# Patient Record
Sex: FEMALE | Race: WHITE
[De-identification: names, ages, dates, MRNs, and addresses within clinical notes are randomized per-mention and may not be internally consistent; named-entity substitution may affect disease eponyms.]

---

## 2017-01-17 ENCOUNTER — HOSPITAL ENCOUNTER (EMERGENCY)
Dept: HOSPITAL 62 - ER | Age: 27
Discharge: HOME | End: 2017-01-17
Payer: SELF-PAY

## 2017-01-17 VITALS — SYSTOLIC BLOOD PRESSURE: 120 MMHG | DIASTOLIC BLOOD PRESSURE: 69 MMHG

## 2017-01-17 DIAGNOSIS — S10.95XA: Primary | ICD-10-CM

## 2017-01-17 DIAGNOSIS — F17.200: ICD-10-CM
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
## 2017-01-17 DIAGNOSIS — Z88.2: ICD-10-CM

## 2017-01-17 DIAGNOSIS — Z88.0: ICD-10-CM

## 2017-01-17 PROCEDURE — 70360 X-RAY EXAM OF NECK: CPT

## 2017-01-17 PROCEDURE — 99283 EMERGENCY DEPT VISIT LOW MDM: CPT

## 2017-01-17 NOTE — ER DOCUMENT REPORT
ED Neck/Back Problem





- General


Chief Complaint: Neck Problem


Stated Complaint: POSSIBLE FOREIGN OBJECT IN NECK


Mode of Arrival: Ambulatory


Information source: Patient


Notes: 


25 y/o F presents to ED  requesting Xray of neck. Pt reports hx of IV drug use 

and states thinks has broken needle in left side of neck. States incident 

occurred approximately 6 months ago and has been encarcerated for the last 6 

months and was recently released and has not been evaluated for this before.  

Reports was evaluated by primary care provider earlier this week and was 

referred to ED for an x-ray.  Denies fever, pain, swelling, redness of breath, 

difficuly breathing or swallowing. 





TRAVEL OUTSIDE OF THE [place] IN LAST 30 DAYS: No





- HPI


Onset: Chronic


Pain Level: Denies


Recent injury: No


Recently seen / treated by doctor: Yes





- Related Data


Allergies/Adverse Reactions: 


 





Penicillins Allergy (Severe, Verified 12/01/15 10:58)


 Hives,anaphylaxis


Sulfa (Sulfonamide Antibiotics) Allergy (Severe, Verified 12/01/15 10:58)


 Hives,anaphylaxis











Past Medical History





- General


Information source: Patient





- Social History


Smoking Status: Current Every Day Smoker


Frequency of alcohol use: None


Drug Abuse: None


Lives with: Family


Family History: Malignancy


Patient has suicidal ideation: No


Patient has homicidal ideation: No


Pulmonary Medical History: Reports: Hx Bronchitis


Renal/ Medical History: Denies: Hx Peritoneal Dialysis


Musculoskeltal Medical History: Denies Hx Fibromyalgia, Reports Hx 

Musculoskeletal Trauma


Psychiatric Medical History: 


   Denies: Hx Bipolar Disorder, Hx Depression, Hx Post Traumatic Stress Disorder

, Hx Schizophrenia


Traumatic Medical History: Reports: Hx Fractures - lt foot   sx 2009


Infectious Medical History: Denies: Hx HIV


Past Surgical History: Reports: Hx  Section, Hx Oral Surgery, Hx 

Orthopedic Surgery - Left foot surgery





- Immunizations


Immunizations up to date: Yes


Hx Diphtheria, Pertussis, Tetanus Vaccination: Yes





Review of Systems





- Review of Systems


Constitutional: No symptoms reported


EENT: See HPI


Cardiovascular: No symptoms reported


Respiratory: No symptoms reported


Gastrointestinal: No symptoms reported


Genitourinary: No symptoms reported


Female Genitourinary: No symptoms reported


Musculoskeletal: No symptoms reported


Skin: No symptoms reported


Hematologic/Lymphatic: No symptoms reported


Neurological/Psychological: No symptoms reported


-: Yes All other systems reviewed and negative





Physical Exam





- Vital signs


Vitals: 


 











Temp Pulse Resp BP Pulse Ox


 


 98.1 F   75   16   122/63   96 


 


 17 15:37  17 15:37  17 15:37  17 15:37  17 15:37











Interpretation: Normal





- General


General appearance: Appears well, Alert


In distress: None





- HEENT


Head: Normocephalic, Atraumatic


Eyes: Normal


Conjunctiva: Normal


Extraocular movements intact: Yes


Eyelashes: Normal


Pupils: PERRL


Ears: Normal


External canal: Normal


Tympanic membrane: Normal


Nasal: Normal


Mouth/Lips: Normal


Mucous membranes: Normal, Moist


Pharynx: Normal.  No: Blood in hypopharynx, Erythema, Exudate, Peritonsillar 

abscess, Post nasal drainage, Retropharyngeal abscess, Tonsillar hypertrophy, 

Uvular edema, Potential airway comprom., Other


Neck: Normal, Other - Superficial old healed skin scar/sherron marks to 

bilateral lower neck likely from reported iv drug use.  No swelling, crepitus, 

erythema, warmth, or palpable foreign body..  No: Anterior cervical chain, 

Posterior cervical chain, Lymphadenopathy, Meningismus, Neck mass, Subcutaneous 

emphysema





- Respiratory


Respiratory status: No respiratory distress


Chest status: Nontender


Breath sounds: Normal - CTAB


Chest palpation: Normal





- Cardiovascular


Rhythm: Regular


Heart sounds: Normal auscultation


Murmur: No


Pulses: Normal: Radial, Posterior tibial, Dorsalis pedis


Normal capillary refill: Yes





- Abdominal


Inspection: Normal


Distension: No distension


Bowel sounds: Normal


Tenderness: Nontender


Organomegaly: No organomegaly





- Back


Back: Normal, Nontender





- Extremities


General upper extremity: Normal inspection, Nontender, Normal color, Normal ROM

, Normal strength, Normal temperature


General lower extremity: Normal inspection, Nontender, Normal color, Normal ROM

, Normal strength, Normal temperature, Normal weight bearing





- Neurological


Neuro grossly intact: Yes


Cognition: Normal


Orientation: AAOx4


Smith Coma Scale Eye Opening: Spontaneous


Smith Coma Scale Verbal: Oriented


Smith Coma Scale Motor: Obeys Commands


Smith Coma Scale Total: 15


Speech: Normal


Motor strength normal: LUE, RUE, LLE, RLE


Sensory: Normal





- Psychological


Associated symptoms: Normal affect, Normal mood





- Skin


Skin Temperature: Warm


Skin Moisture: Dry


Skin Color: Normal





Course





- Re-evaluation


Re-evalutation: 


17 17:43


She hemodynamically stable, in no distress.  X-rays show bilateral lower neck/

supraclavicular region metallic foreign bodies in the resemblance of small 

gauge needles without any other abnormalities.  Patient denies any symptoms or 

complaints, has no suggestion of acute complication from foreign bodies and 

appears stable for discharge at this time.  Patient states has appointment with 

primary care provider for follow-up of x-rays in 2 days which she agrees to 

keep.  Home care, follow-up, ED return precautions discussed with patient who 

verbalized understanding and agrees with plan.

















- Vital Signs


Vital signs: 


 











Temp Pulse Resp BP Pulse Ox


 


 98.5 F   78   16   120/69   98 


 


 17 18:05  17 18:05  17 18:05  17 18:05  17 18:05

















- Diagnostic Test


Radiology reviewed: Image reviewed, Reports reviewed





Discharge





- Discharge


Clinical Impression: 


Foreign body of neck, superficial


Qualifiers:


 Encounter type: initial encounter Qualified Code(s): S10.95XA - Superficial 

foreign body of unspecified part of neck, initial encounter





Condition: Stable


Disposition: HOME, SELF-CARE


Instructions:  Retained Subcutaneous Foreign Object ([facility])


Additional Instructions: 


Keep your appointment and follow-up with your primary care provider in 2 days 

as discussed.


Return to the emergency department for any worsening symptoms or concerns.

## 2017-01-17 NOTE — ER DOCUMENT REPORT
ED Medical Screen (RME)





- General


Chief Complaint: Neck Problem


Stated Complaint: POSSIBLE FOREIGN OBJECT IN NECK


Mode of Arrival: Ambulatory


Information source: Patient


Notes: 


25 y/o F presents to ED  requesting Xray of neck. Pt reports hx of IV drug use 

and states thinks has broken needle in left side of neck. States incident 

occurred approximately 6 months ago and has been encarcerated for the last 6 

months and was recently released and has not been evaluated for this before. 

Denies pain, swelling, difficuly breathing or swallowing. 





I have greeted and performed a rapid initial assessment of this patient.  A 

comprehensive ED assessment and evaluation of the patient, analysis of test 

results and completion of the medical decision making process will be conducted 

by additional ED providers.


TRAVEL OUTSIDE OF THE [place] IN LAST 30 DAYS: No





- Related Data


Allergies/Adverse Reactions: 


 





Penicillins Allergy (Severe, Verified 12/01/15 10:58)


 Hives,anaphylaxis


Sulfa (Sulfonamide Antibiotics) Allergy (Severe, Verified 12/01/15 10:58)


 Hives,anaphylaxis











Past Medical History


Pulmonary Medical History: Reports: Hx Bronchitis


Musculoskeltal Medical History: Denies Hx Fibromyalgia, Reports Hx 

Musculoskeletal Trauma


Psychiatric Medical History: 


   Denies: Hx Bipolar Disorder, Hx Depression, Hx Post Traumatic Stress Disorder

, Hx Schizophrenia


Traumatic Medical History: Reports: Hx Fractures - lt foot   sx 2009


Infectious Medical History: Denies: Hx HIV


Past Surgical History: Reports: Hx  Section, Hx Oral Surgery, Hx 

Orthopedic Surgery - Left foot surgery





- Immunizations


Immunizations up to date: Yes


Hx Diphtheria, Pertussis, Tetanus Vaccination: Yes





Physical Exam





- Vital signs


Vitals: 





 











Temp Pulse Resp BP Pulse Ox


 


 98.1 F   75   16   122/63   96 


 


 17 15:37  17 15:37  17 15:37  17 15:37  17 15:37














Course





- Vital Signs


Vital signs: 





 











Temp Pulse Resp BP Pulse Ox


 


 98.1 F   75   16   122/63   96 


 


 17 15:37  17 15:37  17 15:37  17 15:37  17 15:37

## 2017-09-18 ENCOUNTER — HOSPITAL ENCOUNTER (OUTPATIENT)
Dept: HOSPITAL 62 - LC | Age: 27
LOS: 1 days | Discharge: ANOTHER HOSPITAL | End: 2017-09-19
Attending: OBSTETRICS & GYNECOLOGY
Payer: MEDICAID

## 2017-09-18 DIAGNOSIS — O60.02: Primary | ICD-10-CM

## 2017-09-18 DIAGNOSIS — Z3A.23: ICD-10-CM

## 2017-09-18 LAB
ADD HIVPANEL?: NO
APPEARANCE UR: (no result)
BARBITURATES UR QL SCN: NEGATIVE
BASOPHILS # BLD AUTO: 0 10^3/UL (ref 0–0.2)
BASOPHILS NFR BLD AUTO: 0.2 % (ref 0–2)
BILIRUB UR QL STRIP: NEGATIVE
EOSINOPHIL # BLD AUTO: 0.1 10^3/UL (ref 0–0.6)
EOSINOPHIL NFR BLD AUTO: 0.7 % (ref 0–6)
ERYTHROCYTE [DISTWIDTH] IN BLOOD BY AUTOMATED COUNT: 14.7 % (ref 11.5–14)
GLUCOSE UR STRIP-MCNC: NEGATIVE MG/DL
HCT VFR BLD CALC: 27.8 % (ref 36–47)
HGB BLD-MCNC: 9.2 G/DL (ref 12–15.5)
HGB HCT DIFFERENCE: -0.2
HIV (1 AND 2) ANTIBODY: NEGATIVE
KETONES UR STRIP-MCNC: NEGATIVE MG/DL
LYMPHOCYTES # BLD AUTO: 2.2 10^3/UL (ref 0.5–4.7)
LYMPHOCYTES NFR BLD AUTO: 14.2 % (ref 13–45)
MCH RBC QN AUTO: 25.4 PG (ref 27–33.4)
MCHC RBC AUTO-ENTMCNC: 33.1 G/DL (ref 32–36)
MCV RBC AUTO: 77 FL (ref 80–97)
METHADONE UR QL SCN: NEGATIVE
MONOCYTES # BLD AUTO: 1.5 10^3/UL (ref 0.1–1.4)
MONOCYTES NFR BLD AUTO: 9.8 % (ref 3–13)
NEUTROPHILS # BLD AUTO: 11.8 10^3/UL (ref 1.7–8.2)
NEUTS SEG NFR BLD AUTO: 75.1 % (ref 42–78)
NITRITE UR QL STRIP: NEGATIVE
PCP UR QL SCN: NEGATIVE
PH UR STRIP: 7 [PH] (ref 5–9)
PROT UR STRIP-MCNC: NEGATIVE MG/DL
RBC # BLD AUTO: 3.62 10^6/UL (ref 3.72–5.28)
RUBV IGG SER-ACNC: 113 IU/ML
SP GR UR STRIP: 1
URINE OPIATES LOW: (no result)
UROBILINOGEN UR-MCNC: NEGATIVE MG/DL (ref ?–2)
WBC # BLD AUTO: 15.7 10^3/UL (ref 4–10.5)

## 2017-09-18 PROCEDURE — 80307 DRUG TEST PRSMV CHEM ANLYZR: CPT

## 2017-09-18 PROCEDURE — G6056 ASSAY OF OPIATES: HCPCS

## 2017-09-18 PROCEDURE — 86803 HEPATITIS C AB TEST: CPT

## 2017-09-18 PROCEDURE — 4A1HXCZ MONITORING OF PRODUCTS OF CONCEPTION, CARDIAC RATE, EXTERNAL APPROACH: ICD-10-PCS | Performed by: OBSTETRICS & GYNECOLOGY

## 2017-09-18 PROCEDURE — 86762 RUBELLA ANTIBODY: CPT

## 2017-09-18 PROCEDURE — 85025 COMPLETE CBC W/AUTO DIFF WBC: CPT

## 2017-09-18 PROCEDURE — 81001 URINALYSIS AUTO W/SCOPE: CPT

## 2017-09-18 PROCEDURE — 86804 HEP C AB TEST CONFIRM: CPT

## 2017-09-18 PROCEDURE — 59899 UNLISTED PX MAT CARE&DLVR: CPT

## 2017-09-18 PROCEDURE — 36415 COLL VENOUS BLD VENIPUNCTURE: CPT

## 2017-09-18 PROCEDURE — 86850 RBC ANTIBODY SCREEN: CPT

## 2017-09-18 PROCEDURE — 76815 OB US LIMITED FETUS(S): CPT

## 2017-09-18 PROCEDURE — 86701 HIV-1ANTIBODY: CPT

## 2017-09-18 PROCEDURE — 86901 BLOOD TYPING SEROLOGIC RH(D): CPT

## 2017-09-18 PROCEDURE — 86592 SYPHILIS TEST NON-TREP QUAL: CPT

## 2017-09-18 PROCEDURE — 96372 THER/PROPH/DIAG INJ SC/IM: CPT

## 2017-09-18 PROCEDURE — 86900 BLOOD TYPING SEROLOGIC ABO: CPT

## 2017-09-18 PROCEDURE — 87340 HEPATITIS B SURFACE AG IA: CPT

## 2017-09-18 PROCEDURE — 80361 OPIATES 1 OR MORE: CPT

## 2017-09-18 NOTE — RADIOLOGY REPORT (SQ)
EXAM DESCRIPTION:  U/S OB LIMITED



COMPLETED DATE/TIME:  9/18/2017 9:22 pm



REASON FOR STUDY:  cervical lengthplacentalocation,r/oabruption



COMPARISON:  None.



TECHNIQUE:  Limited transabdominal grayscale ultrasound for evaluation of specific requested obstetri
cheri parameters.



LIMITATIONS:  None.



FINDINGS:  CERVICAL LENGTH: The cervix appears open with a hyperechoic area possibly representing a b
lood clot.

FHR: 147 beats per minute.

PRESENTATION: Cephalic.

OTHER: No other significant findings.



IMPRESSION:  LIMITED OBSTETRICAL ULTRASOUND WITH MEASURED PARAMETERS DELINEATED ABOVE.



TECHNICAL DOCUMENTATION:  JOB ID:  [id number]

 2011 [employer]- All Rights Reserved

## 2017-09-18 NOTE — PDOC TRANSFER SUMMARY
General





- Transfer Diagnosis


(1) Breech presentation


Is this a current diagnosis for this admission?: Yes   





(2) Delivery by elective caesarean section


Is this a current diagnosis for this admission?: Yes   





(3) Heroin abuse


Is this a current diagnosis for this admission?: Yes   





(4) Insufficient prenatal care


Is this a current diagnosis for this admission?: Yes   





(5) Methadone maintenance therapy patient


Is this a current diagnosis for this admission?: Yes   





(6) Pregnancy


Is this a current diagnosis for this admission?: Yes   





- Transfer Medications


Home Medications: 


 





No Home Medications  09/18/17 








Transfer Medications: 


 Current Medications





Betamethasone Acet/Betameth SodPhos (Celestone Inj 6 Mg/1 Ml)  6 mg IM DAILY ELVIN


   Stop: 09/18/17 22:31


Magnesium Sulfate (Magnesium Sulfate Rtu 20 Gm/500 Ml Premix)  20 gm in 500 mls 

@ 50 mls/hr IV CONTINUOUS PRN


   PRN Reason: THIS MED IS NOT "PRN"


   Stop: 10/18/17 22:24


Magnesium Sulfate (Magnesium Sulfate Rtu 4 Gm/100 Ml Premix Bag)  4 gm in 100 

mls @ 300 mls/hr IV NOW ONE


   Stop: 09/18/17 22:44











- Allergies


Allergies/Adverse Reactions: 


 





Penicillins Allergy (Severe, Verified 09/18/17 19:44)


 Hives,anaphylaxis


Sulfa (Sulfonamide Antibiotics) Allergy (Severe, Verified 09/18/17 19:44)


 Hives,anaphylaxis











Hospital Course


Hospital Course: 





27 6/7 wks presented with c/o abdominal pain and bleeding.  sono revealed <1 cm 

cervix length.  digital exam 1-2 cm/thick/high.  





Physical Exam


Vital Signs: 


 Intake & Output











 09/17/17 09/18/17 09/19/17





 06:59 06:59 06:59


 


Weight   80 kg














Results


Laboratory Results: 


 





 09/18/17 22:17 





 











  09/18/17 09/18/17





  20:53 22:17


 


WBC   15.7 H


 


RBC   3.62 L


 


Hgb   9.2 L


 


Hct   27.8 L


 


MCV   77 L


 


MCH   25.4 L


 


MCHC   33.1


 


RDW   14.7 H


 


Plt Count   328


 


Seg Neutrophils %   75.1


 


Lymphocytes %   14.2


 


Monocytes %   9.8


 


Eosinophils %   0.7


 


Basophils %   0.2


 


Absolute Neutrophils   11.8 H


 


Absolute Lymphocytes   2.2


 


Absolute Monocytes   1.5 H


 


Absolute Eosinophils   0.1


 


Absolute Basophils   0.0


 


Urine Color  YELLOW 


 


Urine Appearance  SLIGHTLY-CLOUDY 


 


Urine pH  7.0 


 


Ur Specific Gravity  1.003 


 


Urine Protein  NEGATIVE 


 


Urine Glucose (UA)  NEGATIVE 


 


Urine Ketones  NEGATIVE 


 


Urine Blood  LARGE H 


 


Urine Nitrite  NEGATIVE 


 


Ur Leukocyte Esterase  SMALL H 


 


Urine WBC (Auto)  9 


 


Urine RBC (Auto)  3 











Impressions: 


 





Obstetrics Ultrasound  09/18/17 00:00


IMPRESSION:  LIMITED OBSTETRICAL ULTRASOUND WITH MEASURED PARAMETERS DELINEATED 

ABOVE.


 














Plan


Time Spent: Greater than 30 Minutes - transfer to [facility] L&D department.  Dr Kimball accepting physician.  Mag Sulfate, ACS protocol and GBS antibiotics 

initiated here prior to transfer.

## 2017-09-20 LAB — HCV AB SER IA-ACNC: >11 S/CO RATIO (ref 0–0.9)

## 2018-03-15 ENCOUNTER — HOSPITAL ENCOUNTER (EMERGENCY)
Dept: HOSPITAL 62 - ER | Age: 28
Discharge: HOME | End: 2018-03-15
Payer: SELF-PAY

## 2018-03-15 VITALS — SYSTOLIC BLOOD PRESSURE: 123 MMHG | DIASTOLIC BLOOD PRESSURE: 68 MMHG

## 2018-03-15 DIAGNOSIS — O99.330: ICD-10-CM

## 2018-03-15 DIAGNOSIS — M54.6: ICD-10-CM

## 2018-03-15 DIAGNOSIS — O99.89: Primary | ICD-10-CM

## 2018-03-15 DIAGNOSIS — Z3A.00: ICD-10-CM

## 2018-03-15 PROCEDURE — 96372 THER/PROPH/DIAG INJ SC/IM: CPT

## 2018-03-15 PROCEDURE — 99283 EMERGENCY DEPT VISIT LOW MDM: CPT

## 2018-03-15 NOTE — ER DOCUMENT REPORT
HPI





- HPI


Pain Level: 5


Notes: 





Patient is a 27-year-old female with no significant past medical history who 

presents to the ED complaining of upper back pain is 1 month.  Patient states 

that the pain was on the right side then moved to her left over time went away 

and now returned near the middle of her back.  Patient states that the pain 

does not radiate.  Patient states that the pain feels like a spasming.  Patient 

does not recall any injury.  She denies any IV drug use, but does admit to 

smoking.  Denies any injections or procedures to her back.  Denies any previous 

history of spinal abscess.  Patient is eating and drinking without any 

difficulties.  She is urinating normally having normal bowel movements.  

Patient does not have any abdominal pain.  No other concerns or complaints at 

this time.  Denies any headache, fever, neck pain, URI, sore throat, chest pain

, palpitations, syncope, cough, shortness of breath, wheeze, dyspnea, abdominal 

pain, nausea/vomiting/diarrhea, urinary retention, dysuria, hematuria, loss of 

control of bowel or bladder, numbness/tingling, saddle anesthesia, muscle 

paralysis/weakness, or rash.





- ROS


Systems Reviewed and Negative: Yes All other systems reviewed and negative





- CONSTITUTIONAL


Constitutional: DENIES: Fever, Chills





- REPRODUCTIVE


Reproductive: REPORTS: Pregnant:





Past Medical History





- Social History


Smoking Status: Current Every Day Smoker


Frequency of alcohol use: None


Drug Abuse: None


Family History: Malignancy


Patient has suicidal ideation: No


Patient has homicidal ideation: No


Pulmonary Medical History: Reports: Hx Bronchitis


Renal/ Medical History: Denies: Hx Peritoneal Dialysis


Musculoskeltal Medical History: Denies Hx Fibromyalgia, Reports Hx 

Musculoskeletal Trauma


Psychiatric Medical History: 


   Denies: Hx Bipolar Disorder, Hx Depression, Hx Post Traumatic Stress Disorder

, Hx Schizophrenia


Traumatic Medical History: Reports: Hx Fractures - lt foot   sx 2009


Infectious Medical History: Denies: Hx HIV


Past Surgical History: Reports: Hx  Section, Hx Oral Surgery, Hx 

Orthopedic Surgery - Left foot surgery





- Immunizations


Immunizations up to date: Yes


Hx Diphtheria, Pertussis, Tetanus Vaccination: Yes





Vertical Provider Document





- CONSTITUTIONAL


Agree With Documented VS: Yes


Notes: 





PHYSICAL EXAMINATION:





GENERAL: Well-appearing, well-nourished and in no acute distress. 





LUNGS: Breath sounds clear to auscultation bilaterally and equal.  No wheezes 

rales or rhonchi.





HEART: Regular rate and rhythm without murmurs, rubs, gallops.





ABDOMEN: Soft, nontender, nondistended abdomen.  No guarding, no rebound.  No 

masses appreciated.  Normal bowel sounds present.  No CVA tenderness 

bilaterally.  No pulsatile mass





Musculoskeletal: LE's b/l:  FROM to passive/active. Strength 5+/5.  No deficits 

noted.  No bony tenderness of extremities.  Stretching the arm across the chest 

does elicit the pain felt to her back ("stretches/pulling").  





Back:  FROM to passive/active.  Strength 5+/5.  No vertebral point tenderness, 

stepoffs, or deformities.  No other bony tenderness, erythema, swelling, or 

ecchymosis.  SLR negative b/l.  + mild tenderness to the T-paraspinal mm b/l.  

Mild spasming.  No SI jt tenderness.  No foot drop





Extremities:  No cyanosis, clubbing, or edema b/l.  Peripheral pulses 2+.  

Capillary refill less than 2 seconds.





NEUROLOGICAL: Normal speech, normal gait.  Normal sensory, motor exams.  

Reflexes 2+ b/l.  





PSYCH: Normal mood, normal affect.





SKIN: Warm, Dry, normal turgor, no rashes or lesions noted.





- INFECTION CONTROL


TRAVEL OUTSIDE OF THE [place] IN LAST 30 DAYS: No





- RESPIRATORY


O2 Sat by Pulse Oximetry: 97





Course





- Re-evaluation


Re-evalutation: 





03/15/18 12:10


Patient is an afebrile, well-hydrated, 27-year-old female who presents to the 

ED with thoracic paraspinal back pain.  Vitals are acceptable.  PE is otherwise 

unremarkable for any focal neurological deficits.  I suspect that this is a 

back strain.  No labs or imaging warranted at this time based on H&P.  Lidoderm 

patch was applied and Toradol given.  Low suspicion for any meningitis, fracture

, expanding/ruptured AAA, cauda equina syndrome, epidural mass lesion/abscess, 

herniated disc causing severe spinal stenosis, or other systemic infection at 

this time.  Patient is aware that her condition can change from initial 

presentation and that she needs monitor symptoms closely for any acute changes.

  I will send her home with a prescription for naproxen and baclofen.  

Recommend conservative measures for symptoms.  Recheck with your PCM in 3-5 

days.  Consider consult orthopedics/physical therapy.  Return to the ED with 

any worsening/concerning symptoms otherwise as reviewed discharge.  Patient is 

in agreement.





- Vital Signs


Vital signs: 


 











Temp Pulse Resp BP Pulse Ox


 


 98.6 F   85   17   120/64   97 


 


 03/15/18 10:56  03/15/18 10:56  03/15/18 10:56  03/15/18 10:56  03/15/18 10:56














Discharge





- Discharge


Clinical Impression: 


Thoracic back pain


Qualifiers:


 Chronicity: acute Back pain laterality: bilateral Qualified Code(s): M54.6 - 

Pain in thoracic spine





Condition: Stable


Disposition: HOME, SELF-CARE


Instructions:  Ice Packs ([facility]), Muscle Relaxers ([facility]), Muscle Strain ([facility]), 

Stretching Exercises for the Back ([facility]), Upper Back Strain ([facility]), Warm Packs (

[facility])


Additional Instructions: 


Rest, Ice


Tylenol/ibuprofen as needed


Light stretches daily


Strength exercises as able


Moist heat and massage may help


F/u with your PCP in 3-5 days for a recheck


Consider consult(s) with Orthopedics/physical therapy for ongoing/worsening 

symptoms





Return to the ED with any worsening symptoms and/or development of fever, 

headache, chest pain, palpitations, syncope, shortness of breath, trouble 

breathing, abdominal pain, n/v/d, blood in stool/urine, loss of control of bowel

/bladder, urinary retention, muscle weakness/paralysis, saddle anesthesia, 

numbness/tingling, or other worsening symptoms that are concerning to you.


Prescriptions: 


Baclofen [Baclofen 10 mg Tablet] 5 - 10 mg PO BID PRN #10 tablet


 PRN Reason: 


Naproxen 500 mg PO BID PRN #30 tablet


 PRN Reason: 


Forms:  Smoking Cessation Education


Referrals: 


[facility] [Provider Group] - Follow up as needed

## 2018-07-07 ENCOUNTER — HOSPITAL ENCOUNTER (EMERGENCY)
Dept: HOSPITAL 62 - ER | Age: 28
Discharge: ANOTHER HOSPITAL | End: 2018-07-07
Payer: SELF-PAY

## 2018-07-07 VITALS — SYSTOLIC BLOOD PRESSURE: 105 MMHG | DIASTOLIC BLOOD PRESSURE: 58 MMHG

## 2018-07-07 DIAGNOSIS — J18.9: ICD-10-CM

## 2018-07-07 DIAGNOSIS — R65.21: ICD-10-CM

## 2018-07-07 DIAGNOSIS — F11.10: ICD-10-CM

## 2018-07-07 DIAGNOSIS — F17.200: ICD-10-CM

## 2018-07-07 DIAGNOSIS — N12: ICD-10-CM

## 2018-07-07 DIAGNOSIS — A41.9: Primary | ICD-10-CM

## 2018-07-07 DIAGNOSIS — D64.9: ICD-10-CM

## 2018-07-07 DIAGNOSIS — R53.1: ICD-10-CM

## 2018-07-07 DIAGNOSIS — D65: ICD-10-CM

## 2018-07-07 DIAGNOSIS — R41.82: ICD-10-CM

## 2018-07-07 LAB
%HYPO/RBC NFR BLD AUTO: (no result) %
ABSOLUTE LYMPHOCYTES# (MANUAL): 0.8 10^3/UL (ref 0.5–4.7)
ABSOLUTE MONOCYTES # (MANUAL): 0.8 10^3/UL (ref 0.1–1.4)
ABSOLUTE NEUTROPHILS# (MANUAL): 24 10^3/UL (ref 1.7–8.2)
ADD MANUAL DIFF: YES
ALBUMIN SERPL-MCNC: 2.3 G/DL (ref 3.5–5)
ALP SERPL-CCNC: 153 U/L (ref 38–126)
ALT SERPL-CCNC: 22 U/L (ref 9–52)
ANION GAP SERPL CALC-SCNC: 22 MMOL/L (ref 5–19)
ANISOCYTOSIS BLD QL SMEAR: (no result)
APPEARANCE UR: (no result)
APTT BLD: 37.6 SEC (ref 23.5–35.8)
APTT PPP: (no result) S
AST SERPL-CCNC: 65 U/L (ref 14–36)
BARBITURATES UR QL SCN: NEGATIVE
BASE EXCESS BLDV CALC-SCNC: -4.6 MMOL/L
BASOPHILS NFR BLD MANUAL: 0 % (ref 0–2)
BILIRUB DIRECT SERPL-MCNC: 0.6 MG/DL (ref 0–0.4)
BILIRUB SERPL-MCNC: 0.9 MG/DL (ref 0.2–1.3)
BILIRUB UR QL STRIP: NEGATIVE
BUN SERPL-MCNC: 30 MG/DL (ref 7–20)
CALCIUM: 7.3 MG/DL (ref 8.4–10.2)
CHLORIDE SERPL-SCNC: 83 MMOL/L (ref 98–107)
CK SERPL-CCNC: < 20 U/L (ref 30–135)
CO2 SERPL-SCNC: 17 MMOL/L (ref 22–30)
D DIMER PPP FEU-MCNC: 9.43 UG/ML (ref 0–0.5)
EOSINOPHIL NFR BLD MANUAL: 0 % (ref 0–6)
ERYTHROCYTE [DISTWIDTH] IN BLOOD BY AUTOMATED COUNT: 19.5 % (ref 11.5–14)
FIBRINOGEN PPP-MCNC: 158 MG/DL (ref 209–497)
GLUCOSE SERPL-MCNC: 108 MG/DL (ref 75–110)
GLUCOSE UR STRIP-MCNC: NEGATIVE MG/DL
HCO3 BLDV-SCNC: 17.6 MMOL/L (ref 20–32)
HCT VFR BLD CALC: 21.2 % (ref 36–47)
HGB BLD-MCNC: 7 G/DL (ref 12–15.5)
INR PPP: 1.63
KETONES UR STRIP-MCNC: NEGATIVE MG/DL
MCH RBC QN AUTO: 22.7 PG (ref 27–33.4)
MCHC RBC AUTO-ENTMCNC: 33 G/DL (ref 32–36)
MCV RBC AUTO: 69 FL (ref 80–97)
METHADONE UR QL SCN: NEGATIVE
MONOCYTES % (MANUAL): 3 % (ref 3–13)
NEUTS BAND NFR BLD MANUAL: 6 % (ref 3–5)
NITRITE UR QL STRIP: NEGATIVE
NRBC BLD AUTO-RTO: 4 /100 WBC
NT PRO BNP: 2850 PG/ML (ref ?–125)
PCO2 BLDV: 24.4 MMHG (ref 35–63)
PCP UR QL SCN: NEGATIVE
PH BLDV: 7.48 [PH] (ref 7.3–7.42)
PH UR STRIP: 5 [PH] (ref 5–9)
PLATELET # BLD: 15 10^3/UL (ref 150–450)
PLATELET COMMENT: (no result)
POTASSIUM SERPL-SCNC: 3.5 MMOL/L (ref 3.6–5)
PROT SERPL-MCNC: 6.5 G/DL (ref 6.3–8.2)
PROT UR STRIP-MCNC: 30 MG/DL
PROTHROMBIN TIME: 20.1 SEC (ref 11.4–15.4)
RBC # BLD AUTO: 3.09 10^6/UL (ref 3.72–5.28)
SCHISTOCYTES BLD QL SMEAR: SLIGHT
SEGMENTED NEUTROPHILS % (MAN): 88 % (ref 42–78)
SODIUM SERPL-SCNC: 122.2 MMOL/L (ref 137–145)
SP GR UR STRIP: 1.01
TOTAL CELLS COUNTED BLD: 100
TOXIC GRANULES BLD QL SMEAR: (no result)
TROPONIN I SERPL-MCNC: < 0.012 NG/ML
URINE AMPHETAMINES SCREEN: NEGATIVE
URINE BENZODIAZEPINES SCREEN: NEGATIVE
URINE COCAINE SCREEN: NEGATIVE
URINE MARIJUANA (THC) SCREEN: NEGATIVE
UROBILINOGEN UR-MCNC: 4 MG/DL (ref ?–2)
VARIANT LYMPHS NFR BLD MANUAL: 3 % (ref 13–45)
WBC # BLD AUTO: 25.5 10^3/UL (ref 4–10.5)
WBC TOXIC VACUOLES BLD QL SMEAR: PRESENT

## 2018-07-07 PROCEDURE — 96366 THER/PROPH/DIAG IV INF ADDON: CPT

## 2018-07-07 PROCEDURE — 71045 X-RAY EXAM CHEST 1 VIEW: CPT

## 2018-07-07 PROCEDURE — 84484 ASSAY OF TROPONIN QUANT: CPT

## 2018-07-07 PROCEDURE — C1751 CATH, INF, PER/CENT/MIDLINE: HCPCS

## 2018-07-07 PROCEDURE — 81001 URINALYSIS AUTO W/SCOPE: CPT

## 2018-07-07 PROCEDURE — 96365 THER/PROPH/DIAG IV INF INIT: CPT

## 2018-07-07 PROCEDURE — 85379 FIBRIN DEGRADATION QUANT: CPT

## 2018-07-07 PROCEDURE — 85362 FIBRIN DEGRADATION PRODUCTS: CPT

## 2018-07-07 PROCEDURE — 82550 ASSAY OF CK (CPK): CPT

## 2018-07-07 PROCEDURE — 87077 CULTURE AEROBIC IDENTIFY: CPT

## 2018-07-07 PROCEDURE — 85384 FIBRINOGEN ACTIVITY: CPT

## 2018-07-07 PROCEDURE — 87186 SC STD MICRODIL/AGAR DIL: CPT

## 2018-07-07 PROCEDURE — P9016 RBC LEUKOCYTES REDUCED: HCPCS

## 2018-07-07 PROCEDURE — 06HY33Z INSERTION OF INFUSION DEVICE INTO LOWER VEIN, PERCUTANEOUS APPROACH: ICD-10-PCS | Performed by: EMERGENCY MEDICINE

## 2018-07-07 PROCEDURE — 85025 COMPLETE CBC W/AUTO DIFF WBC: CPT

## 2018-07-07 PROCEDURE — 80053 COMPREHEN METABOLIC PANEL: CPT

## 2018-07-07 PROCEDURE — 99291 CRITICAL CARE FIRST HOUR: CPT

## 2018-07-07 PROCEDURE — 87086 URINE CULTURE/COLONY COUNT: CPT

## 2018-07-07 PROCEDURE — 99292 CRITICAL CARE ADDL 30 MIN: CPT

## 2018-07-07 PROCEDURE — 86901 BLOOD TYPING SEROLOGIC RH(D): CPT

## 2018-07-07 PROCEDURE — 83880 ASSAY OF NATRIURETIC PEPTIDE: CPT

## 2018-07-07 PROCEDURE — 87040 BLOOD CULTURE FOR BACTERIA: CPT

## 2018-07-07 PROCEDURE — 36430 TRANSFUSION BLD/BLD COMPNT: CPT

## 2018-07-07 PROCEDURE — 86900 BLOOD TYPING SEROLOGIC ABO: CPT

## 2018-07-07 PROCEDURE — 36556 INSERT NON-TUNNEL CV CATH: CPT

## 2018-07-07 PROCEDURE — 86850 RBC ANTIBODY SCREEN: CPT

## 2018-07-07 PROCEDURE — 84703 CHORIONIC GONADOTROPIN ASSAY: CPT

## 2018-07-07 PROCEDURE — 85730 THROMBOPLASTIN TIME PARTIAL: CPT

## 2018-07-07 PROCEDURE — 93010 ELECTROCARDIOGRAM REPORT: CPT

## 2018-07-07 PROCEDURE — 36415 COLL VENOUS BLD VENIPUNCTURE: CPT

## 2018-07-07 PROCEDURE — 96368 THER/DIAG CONCURRENT INF: CPT

## 2018-07-07 PROCEDURE — 83605 ASSAY OF LACTIC ACID: CPT

## 2018-07-07 PROCEDURE — 82803 BLOOD GASES ANY COMBINATION: CPT

## 2018-07-07 PROCEDURE — 80307 DRUG TEST PRSMV CHEM ANLYZR: CPT

## 2018-07-07 PROCEDURE — 85610 PROTHROMBIN TIME: CPT

## 2018-07-07 PROCEDURE — 87088 URINE BACTERIA CULTURE: CPT

## 2018-07-07 PROCEDURE — 86920 COMPATIBILITY TEST SPIN: CPT

## 2018-07-07 PROCEDURE — 93005 ELECTROCARDIOGRAM TRACING: CPT

## 2018-07-07 PROCEDURE — 96367 TX/PROPH/DG ADDL SEQ IV INF: CPT

## 2018-07-07 NOTE — EKG REPORT
SEVERITY:- BORDERLINE ECG -

SINUS TACHYCARDIA

BORDERLINE ST ELEVATION, INFERIOR LEADS

BORDERLINE PROLONGED QT INTERVAL

:

Confirmed by: Ryan Barron MD 07-Jul-2018 10:29:40

## 2018-07-07 NOTE — RADIOLOGY REPORT (SQ)
EXAM DESCRIPTION:

XR CHEST 1 VIEW



COMPLETED DATE/TME:  07/07/2018 00:33



CLINICAL HISTORY:

27 years Female, hypoxia, fever



COMPARISON:

2.18.16



NUMBER OF VIEWS/TECHNIQUE:

1/AP



FINDINGS:



Adequate lung volume, moderate patchy opacities include the right

upper lobe and left lower lobe normal cardiac silhouette, and

intact bony thorax.



IMPRESSION:



Moderate multifocal pneumonia.

## 2018-07-07 NOTE — ER DOCUMENT REPORT
ED General





- General


Stated Complaint: WEAKNESS


Time Seen by Provider: 18 00:20


Cannot obtain history due to: Unstable vital signs, Altered mental status


Notes: 





Patient is a 27-year-old female with a past medical history of hepatitis C, 

former IV drug use per her report but none in the last 2 years per her report, 

who presents lethargic, listless, confused and extremely ill in appearance.  

Patient is unable to provide any meaningful history.  Her grandmother at the 

bedside reports that the patient has been increasingly unwell over the last 3-4 

days, has not gotten out of bed and that she has been bringing her bucket to 

urinate in.  The patient has no other known chronic medical problems.  She is 

not currently taking any medications.  No additional history can be obtained 

secondary to the patient's critical condition.


TRAVEL OUTSIDE OF THE [place] IN LAST 30 DAYS: No





- Related Data


Allergies/Adverse Reactions: 


 





Penicillins Allergy (Severe, Verified 17 19:44)


 Hives,anaphylaxis


Sulfa (Sulfonamide Antibiotics) Allergy (Severe, Verified 17 19:44)


 Hives,anaphylaxis











Past Medical History





- General


Information source: Relative


Cannot obtain history due to: Unstable vital signs, Altered mental status





- Social History


Smoking Status: Current Every Day Smoker


Frequency of alcohol use: None


Drug Abuse: Heroin - Patient denies current use


Lives with: Family


Family History: Malignancy


Pulmonary Medical History: Reports: Hx Bronchitis


Renal/ Medical History: Denies: Hx Peritoneal Dialysis


Musculoskeltal Medical History: Denies Hx Fibromyalgia, Reports Hx 

Musculoskeletal Trauma


Psychiatric Medical History: 


   Denies: Hx Bipolar Disorder, Hx Depression, Hx Post Traumatic Stress Disorder

, Hx Schizophrenia


Traumatic Medical History: Reports: Hx Fractures - lt foot   sx 2009


Infectious Medical History: Denies: Hx HIV


Past Surgical History: Reports: Hx  Section, Hx Oral Surgery, Hx 

Orthopedic Surgery - Left foot surgery





- Immunizations


Immunizations up to date: Yes


Hx Diphtheria, Pertussis, Tetanus Vaccination: Yes





Review of Systems





- Review of Systems


-: Yes ROS unobtainable due to patient's medical condition





Physical Exam





- Vital signs


Vitals: 


 











Resp Pulse Ox


 


 48 H  92 


 


 18 00:27  18 00:27











Interpretation: Hypotensive, Tachycardic, Tachypneic


Notes: 





PHYSICAL EXAMINATION:





GENERAL: Extremely sick in appearance, lethargic, listless confused





HEAD: Atraumatic, normocephalic.





EYES: Pupils equal round and reactive to light, extraocular movements intact, 

sclera anicteric, conjunctiva are normal.





ENT: Extremely dry mucous membranes with cracking and opening of the soft 

palate as well as several areas of the tongue 





NECK: Normal range of motion, supple without lymphadenopathy





LUNGS: scattered rales in all lung fields more dominant on the right





HEART: Regular tachycardia, soft systolic ejection murmur





ABDOMEN: Soft, nontender, normoactive bowel sounds.  No guarding, no rebound.  

No masses appreciated.





EXTREMITIES: no pitting or edema.  No cyanosis.





NEUROLOGICAL: No focal neurological deficits. Moves all extremities 

spontaneously and on command.





PSYCH: Listless, confused





SKIN: Cool to touch, poor turgor, multiple scabbing lesions over the bilateral 

upper and lower extremities





Course





- Re-evaluation


Re-evalutation: 





18 01:02


Documentation is necessarily delayed as I have been at this patient's bedside 

continuously for the past 25 minutes.  In summary this patient presented with 

acute septic shock, hypotensive, tachycardic, listless, tachypneic, hypoxemic, 

and confused.  She is diffusely icteric, profoundly dehydrated on examination, 

multiple scabbed sores covering multiple areas of her body.  She is a former IV 

drug user although denies adamantly that she has any current use in the past 2 

years.  Due to her former IV drug use, I was unable to locate any peripheral IV 

sites nor any nurses including the external jugular veins.  Moreover, the 

patient was so profoundly ill that multiple points of access were required.  I 

therefore emergently placed a right femoral central line under sterile 

conditions.  As soon as of cyst placed, 2 L of lactated Ringer's were open 

wide.  2 g of cefepime have been initiated.  Bedside pleural ultrasound shows 

trace B-lines.  IVC examination shows severe volume depletion.  Patient is 

severely ill, appears to be in advanced sepsis.  Broad-spectrum antibiotics 

have been initiated, aggressive IV fluid hydration will be initiated, full labs 

including cultures, Luna catheter with temperature probe and appropriate labs 

will be placed.  Chest x-ray will be obtained.  Patient will require frequent 

and regular reassessments that she is critically ill and high risk for death at 

this time.


18 01:51


Patient remains critically ill, overall appears improved after receiving a 

total of 3 L of fluid although remains hypotensive and will require 

norepinephrine infusion to be initiated.  Laboratories are returning showing a 

white count or platelets like 7markedly elevated leukocytosis, anemia at 7 and 

extremely low platelets.  DIC panel has been sent.  Patient is however more 

alert, oriented, speaking to me more coherently.  Will also order 2 units of 

packed red blood cells.


18 01:54


I discussed this case with  who agrees with the probable diagnosis 

of endocarditis with associated multifocal pneumonia and pyelonephritis.  

Patient has associated DIC has fibrogenic is low, awaiting fibrinogen split 

products and d-dimer.  Patient is currently on 10 of norepinephrine, map 69.  

She is receiving her fourth liter of IV fluids.  Awaiting transfer and will 

continue to reassess at regular intervals.


18 0220


On reassessment patient continues to be much more alert and now oriented x3.  

Her map continues to hold above 65 on 10 of norepinephrine.  Repeated physical 

examination does not show any deterioration of her respiratory status.  No 

indication for an airway intervention.  Will continue to reassess at regular 

intervals.


18 03:52


Patient's laboratories do show positive opiate screen.  Neither I nor EMS 

administered any opiates to this patient.  This is consistent with the patient 

having been untruthful regarding her ongoing IV drug abuse and further is my 

suspicion that the likely primary source of her presentation is endocarditis.  

Awaiting transport.


18 04:37


Patient remains in guarded condition although continues to feel and appear 

overall clinically improved particularly relative to initial assessment.  

Awaiting transport.





- Vital Signs


Vital signs: 


 











Temp Pulse Resp BP Pulse Ox


 


 97.7 F   117 H  34 H  106/64   100 


 


 18 04:31  18 03:21  18 04:31  18 04:31  18 04:31














- Laboratory


Result Diagrams: 


 18 00:59





 18 00:59


Laboratory results interpreted by me: 


 











  18





  00:17 00:59 00:59


 


WBC   25.5 H 


 


RBC   3.09 L 


 


Hgb   7.0 L 


 


Hct   21.2 L 


 


MCV   69 L 


 


MCH   22.7 L 


 


RDW   19.5 H 


 


Plt Count   15 L* 


 


Seg Neuts % (Manual)   88 H 


 


Band Neutrophils %   6 H 


 


Lymphocytes % (Manual)   3 L 


 


Abs Neuts (Manual)   24.0 H 


 


PT   


 


APTT   


 


Fibrinogen   


 


Fibrin Degrad Products   


 


D-Dimer   


 


VBG pH   


 


VBG pCO2   


 


VBG HCO3   


 


Sodium    122.2 L


 


Potassium    3.5 L


 


Chloride    83 L


 


Carbon Dioxide    17 L


 


Anion Gap    22 H


 


BUN    30 H


 


Lactic Acid   


 


Calcium    7.3 L


 


Direct Bilirubin    0.6 H


 


AST    65 H


 


Alkaline Phosphatase    153 H


 


Creatine Kinase    < 20 L


 


NT-Pro-B Natriuret Pep   


 


Albumin    2.3 L


 


Urine Protein  30 H  


 


Urine Blood  SMALL H  


 


Urine Urobilinogen  4.0 H  


 


Ur Leukocyte Esterase  LARGE H  


 


Crossmatch   














  18





  00:59 00:59 00:59


 


WBC   


 


RBC   


 


Hgb   


 


Hct   


 


MCV   


 


MCH   


 


RDW   


 


Plt Count   


 


Seg Neuts % (Manual)   


 


Band Neutrophils %   


 


Lymphocytes % (Manual)   


 


Abs Neuts (Manual)   


 


PT   


 


APTT   


 


Fibrinogen   


 


Fibrin Degrad Products   


 


D-Dimer   


 


VBG pH    7.48 H


 


VBG pCO2    24.4 L


 


VBG HCO3    17.6 L


 


Sodium   


 


Potassium   


 


Chloride   


 


Carbon Dioxide   


 


Anion Gap   


 


BUN   


 


Lactic Acid  8.9 H  


 


Calcium   


 


Direct Bilirubin   


 


AST   


 


Alkaline Phosphatase   


 


Creatine Kinase   


 


NT-Pro-B Natriuret Pep   2850 H 


 


Albumin   


 


Urine Protein   


 


Urine Blood   


 


Urine Urobilinogen   


 


Ur Leukocyte Esterase   


 


Crossmatch   














  18





  00:59 00:59 02:26


 


WBC   


 


RBC   


 


Hgb   


 


Hct   


 


MCV   


 


MCH   


 


RDW   


 


Plt Count   


 


Seg Neuts % (Manual)   


 


Band Neutrophils %   


 


Lymphocytes % (Manual)   


 


Abs Neuts (Manual)   


 


PT  20.1 H  


 


APTT  37.6 H  


 


Fibrinogen  158 L  


 


Fibrin Degrad Products    10 TO 40 H


 


D-Dimer  9.43 H  


 


VBG pH   


 


VBG pCO2   


 


VBG HCO3   


 


Sodium   


 


Potassium   


 


Chloride   


 


Carbon Dioxide   


 


Anion Gap   


 


BUN   


 


Lactic Acid   


 


Calcium   


 


Direct Bilirubin   


 


AST   


 


Alkaline Phosphatase   


 


Creatine Kinase   


 


NT-Pro-B Natriuret Pep   


 


Albumin   


 


Urine Protein   


 


Urine Blood   


 


Urine Urobilinogen   


 


Ur Leukocyte Esterase   


 


Crossmatch   See Detail 














- Diagnostic Test


Radiology reviewed: Image reviewed, Reports reviewed


Radiology results interpreted by me: 





18 03:49


Chest x-ray: Bilateral patchy infiltrates





- EKG Interpretation by Me


Additional EKG results interpreted by me: 





18 03:49


Sinus tachycardia.  Rate 121.  Borderline ST elevations in the inferior leads.  

QTC is 481.





Procedures





- Central Line


  ** Right Femoral


Consent obtained: No - Emergent


Central line pre-insertion: Sterile PPE donned, Chloraprep applied, Sterile 

drapes applied


Central line lumen type: Triple


Anesthetic type: 1% Lidocaine


mL's of anesthesia: 4


Ultrasound guided: Yes


CM at insertion site: 30


Line secured with sutures: Yes


Central line post-insertion: Blood return from lumens, Biopatch applied, Sutured

, Sterile dressing applied


Number of attempts: 1


Complications: No





Critical Care Note





- Critical Care Note


Total time excluding time spent on procedures (mins): 78


Comments: 





Critical care time spent obtaining history from patient or surrogate, 

discussions with consultants, development of treatment plan with patient or 

surrogate, evaluation of patient's response to treatment, examination of patient

, ordering and performing treatments and interventions, ordering and review of 

laboratory studies, re-evaluation of patient's condition, ordering and review 

of radiographic studies and review of old charts





Discharge





- Discharge


Clinical Impression: 


 Heroin abuse, Severe sepsis, Septic shock, DIC (disseminated intravascular 

coagulation), Multifocal pneumonia, Pyelonephritis, Probable endocarditis





Anemia


Qualifiers:


 Anemia type: unspecified type Qualified Code(s): D64.9 - Anemia, unspecified





Condition: Critical


Disposition: [facility], 1928–1983


Referrals: 


FRANCISCO TORIBIO PA-C [NO LOCAL MD] - Follow up as needed

## 2018-07-09 LAB — PATH REV BLD -IMP: (no result)

## 2018-08-02 ENCOUNTER — HOSPITAL ENCOUNTER (EMERGENCY)
Dept: HOSPITAL 62 - ER | Age: 28
Discharge: ANOTHER HOSPITAL | End: 2018-08-02
Payer: SELF-PAY

## 2018-08-02 VITALS — SYSTOLIC BLOOD PRESSURE: 107 MMHG | DIASTOLIC BLOOD PRESSURE: 61 MMHG

## 2018-08-02 DIAGNOSIS — I26.99: ICD-10-CM

## 2018-08-02 DIAGNOSIS — R11.2: ICD-10-CM

## 2018-08-02 DIAGNOSIS — R00.0: ICD-10-CM

## 2018-08-02 DIAGNOSIS — Z88.0: ICD-10-CM

## 2018-08-02 DIAGNOSIS — R65.21: ICD-10-CM

## 2018-08-02 DIAGNOSIS — T50.901A: ICD-10-CM

## 2018-08-02 DIAGNOSIS — F11.10: ICD-10-CM

## 2018-08-02 DIAGNOSIS — Z88.2: ICD-10-CM

## 2018-08-02 DIAGNOSIS — R06.4: ICD-10-CM

## 2018-08-02 DIAGNOSIS — I95.9: ICD-10-CM

## 2018-08-02 DIAGNOSIS — I38: ICD-10-CM

## 2018-08-02 DIAGNOSIS — A41.9: Primary | ICD-10-CM

## 2018-08-02 DIAGNOSIS — D65: ICD-10-CM

## 2018-08-02 LAB
ABSOLUTE LYMPHOCYTES# (MANUAL): 0.9 10^3/UL (ref 0.5–4.7)
ABSOLUTE MONOCYTES # (MANUAL): 0.4 10^3/UL (ref 0.1–1.4)
ABSOLUTE NEUTROPHILS# (MANUAL): 21 10^3/UL (ref 1.7–8.2)
ADD MANUAL DIFF: YES
ALBUMIN SERPL-MCNC: 2.8 G/DL (ref 3.5–5)
ALP SERPL-CCNC: 87 U/L (ref 38–126)
ALT SERPL-CCNC: 23 U/L (ref 9–52)
ANION GAP SERPL CALC-SCNC: 23 MMOL/L (ref 5–19)
ANISOCYTOSIS BLD QL SMEAR: (no result)
APPEARANCE UR: (no result)
APTT BLD: 40.8 SEC (ref 23.5–35.8)
APTT PPP: YELLOW S
AST SERPL-CCNC: 70 U/L (ref 14–36)
BARBITURATES UR QL SCN: NEGATIVE
BASOPHILS NFR BLD MANUAL: 0 % (ref 0–2)
BILIRUB DIRECT SERPL-MCNC: 0.6 MG/DL (ref 0–0.4)
BILIRUB SERPL-MCNC: 1.1 MG/DL (ref 0.2–1.3)
BILIRUB UR QL STRIP: NEGATIVE
BUN SERPL-MCNC: 7 MG/DL (ref 7–20)
CALCIUM: 8.2 MG/DL (ref 8.4–10.2)
CHLORIDE SERPL-SCNC: 104 MMOL/L (ref 98–107)
CO2 SERPL-SCNC: 13 MMOL/L (ref 22–30)
DACRYOCYTES BLD QL SMEAR: SLIGHT
EOSINOPHIL NFR BLD MANUAL: 0 % (ref 0–6)
ERYTHROCYTE [DISTWIDTH] IN BLOOD BY AUTOMATED COUNT: 24.4 % (ref 11.5–14)
FIBRINOGEN PPP-MCNC: 261 MG/DL (ref 209–497)
GLUCOSE SERPL-MCNC: 115 MG/DL (ref 75–110)
GLUCOSE UR STRIP-MCNC: NEGATIVE MG/DL
HCT VFR BLD CALC: 20 % (ref 36–47)
HGB BLD-MCNC: 6.1 G/DL (ref 12–15.5)
INR PPP: 2.13
KETONES UR STRIP-MCNC: (no result) MG/DL
MCH RBC QN AUTO: 28.1 PG (ref 27–33.4)
MCHC RBC AUTO-ENTMCNC: 30.6 G/DL (ref 32–36)
MCV RBC AUTO: 92 FL (ref 80–97)
METHADONE UR QL SCN: NEGATIVE
MONOCYTES % (MANUAL): 2 % (ref 3–13)
NEUTS BAND NFR BLD MANUAL: 1 % (ref 3–5)
NITRITE UR QL STRIP: NEGATIVE
NRBC BLD AUTO-RTO: 3 /100 WBC
OVALOCYTES BLD QL SMEAR: SLIGHT
PATH REV BLD -IMP: (no result)
PCP UR QL SCN: NEGATIVE
PH UR STRIP: 6 [PH] (ref 5–9)
PLATELET # BLD: 12 10^3/UL (ref 150–450)
PLATELET COMMENT: (no result)
PLATELET LARGE: PRESENT
POIKILOCYTOSIS BLD QL SMEAR: (no result)
POLYCHROMASIA BLD QL SMEAR: SLIGHT
POTASSIUM SERPL-SCNC: 3.7 MMOL/L (ref 3.6–5)
PROT SERPL-MCNC: 6.9 G/DL (ref 6.3–8.2)
PROT UR STRIP-MCNC: 100 MG/DL
PROTHROMBIN TIME: 24.9 SEC (ref 11.4–15.4)
RBC # BLD AUTO: 2.17 10^6/UL (ref 3.72–5.28)
SCHISTOCYTES BLD QL SMEAR: SLIGHT
SEGMENTED NEUTROPHILS % (MAN): 93 % (ref 42–78)
SODIUM SERPL-SCNC: 139.6 MMOL/L (ref 137–145)
SP GR UR STRIP: 1.01
TOTAL CELLS COUNTED BLD: 100
TOXIC GRANULES BLD QL SMEAR: SLIGHT
URINE AMPHETAMINES SCREEN: NEGATIVE
URINE BENZODIAZEPINES SCREEN: NEGATIVE
URINE COCAINE SCREEN: NEGATIVE
URINE MARIJUANA (THC) SCREEN: NEGATIVE
UROBILINOGEN UR-MCNC: NEGATIVE MG/DL (ref ?–2)
VARIANT LYMPHS NFR BLD MANUAL: 4 % (ref 13–45)
WBC # BLD AUTO: 22.3 10^3/UL (ref 4–10.5)

## 2018-08-02 PROCEDURE — 93005 ELECTROCARDIOGRAM TRACING: CPT

## 2018-08-02 PROCEDURE — 87040 BLOOD CULTURE FOR BACTERIA: CPT

## 2018-08-02 PROCEDURE — 83605 ASSAY OF LACTIC ACID: CPT

## 2018-08-02 PROCEDURE — 51702 INSERT TEMP BLADDER CATH: CPT

## 2018-08-02 PROCEDURE — 86901 BLOOD TYPING SEROLOGIC RH(D): CPT

## 2018-08-02 PROCEDURE — 80053 COMPREHEN METABOLIC PANEL: CPT

## 2018-08-02 PROCEDURE — 86900 BLOOD TYPING SEROLOGIC ABO: CPT

## 2018-08-02 PROCEDURE — 96375 TX/PRO/DX INJ NEW DRUG ADDON: CPT

## 2018-08-02 PROCEDURE — 96365 THER/PROPH/DIAG IV INF INIT: CPT

## 2018-08-02 PROCEDURE — 81001 URINALYSIS AUTO W/SCOPE: CPT

## 2018-08-02 PROCEDURE — 85730 THROMBOPLASTIN TIME PARTIAL: CPT

## 2018-08-02 PROCEDURE — 86850 RBC ANTIBODY SCREEN: CPT

## 2018-08-02 PROCEDURE — 96368 THER/DIAG CONCURRENT INF: CPT

## 2018-08-02 PROCEDURE — 36415 COLL VENOUS BLD VENIPUNCTURE: CPT

## 2018-08-02 PROCEDURE — 99292 CRITICAL CARE ADDL 30 MIN: CPT

## 2018-08-02 PROCEDURE — 93010 ELECTROCARDIOGRAM REPORT: CPT

## 2018-08-02 PROCEDURE — 85610 PROTHROMBIN TIME: CPT

## 2018-08-02 PROCEDURE — 71275 CT ANGIOGRAPHY CHEST: CPT

## 2018-08-02 PROCEDURE — P9016 RBC LEUKOCYTES REDUCED: HCPCS

## 2018-08-02 PROCEDURE — P9017 PLASMA 1 DONOR FRZ W/IN 8 HR: HCPCS

## 2018-08-02 PROCEDURE — 83615 LACTATE (LD) (LDH) ENZYME: CPT

## 2018-08-02 PROCEDURE — 96367 TX/PROPH/DG ADDL SEQ IV INF: CPT

## 2018-08-02 PROCEDURE — 71045 X-RAY EXAM CHEST 1 VIEW: CPT

## 2018-08-02 PROCEDURE — 99291 CRITICAL CARE FIRST HOUR: CPT

## 2018-08-02 PROCEDURE — 85025 COMPLETE CBC W/AUTO DIFF WBC: CPT

## 2018-08-02 PROCEDURE — 80307 DRUG TEST PRSMV CHEM ANLYZR: CPT

## 2018-08-02 PROCEDURE — 36556 INSERT NON-TUNNEL CV CATH: CPT

## 2018-08-02 PROCEDURE — 84484 ASSAY OF TROPONIN QUANT: CPT

## 2018-08-02 PROCEDURE — 86920 COMPATIBILITY TEST SPIN: CPT

## 2018-08-02 PROCEDURE — P9035 PLATELET PHERES LEUKOREDUCED: HCPCS

## 2018-08-02 PROCEDURE — 82962 GLUCOSE BLOOD TEST: CPT

## 2018-08-02 PROCEDURE — 36430 TRANSFUSION BLD/BLD COMPNT: CPT

## 2018-08-02 PROCEDURE — 85384 FIBRINOGEN ACTIVITY: CPT

## 2018-08-02 PROCEDURE — C1751 CATH, INF, PER/CENT/MIDLINE: HCPCS

## 2018-08-02 NOTE — RADIOLOGY REPORT (SQ)
EXAM DESCRIPTION:

CT CHEST ANGIOGRAPHY WITHOUT THEN WITH IV CONTRAST



COMPLETED DATE/TME:  08/02/2018 06:04



CLINICAL HISTORY:

27 years Female, history of septic emboli



Comparison: CR, August 2, 2018, July 7, 2018.



Technique:  IV contrast.  Coronal and sagittal reformat.  3d

reconstruction.  This exam was performed according to our

departmental dose-optimization program, which includes automated

exposure control, adjustment of the mA and/or kV according to

patient size and/or use of iterative reconstruction

technique.[facility]: Dose Right [facility]: CareDose   [facility]: Dose Right   

[facility]: Teradose 4D    [facility]: Smart [employer]



LIMITATIONS:

None



Findings: 

Occlusive emboli/thrombosis throughout the pulmonary arterial

system of the right lower lobe including the right interlobar

arteries.



Scattered, moderate patchy and airspace opacities of both lungs.

Small cystic airspaces of the right lower lobe. Smooth

pericardial thickening of moderate attenuation; differential

diagnosis includes infectious-idiopathic pericarditis. Moderate

mediastinal lymphadenopathy



Irregular endplates at the T4-T5 disc with up to 0.2 cm T4

leftward anterolisthesis and moderate surrounding soft tissue

swelling.



Inferior neck, axillae, mediastinum, lungs, airway, lymphatics,

heart, vasculature, upper abdomen, and musculoskeleton appear

unremarkable.



Impression:

1. Positive for pulmonary embolus. There is occlusive pulmonary

arterial emboli throughout the right lower lobe.

2. Multifocal pneumonia pattern. Differential etiologies include

infectious, inflammatory, infarct/atelectasis, and neoplastic

processes.

3. T4-T5 discitis pattern. Consider laboratory and contrast MRI

surveillance.

4.  Smooth pericardial thickening of moderate attenuation;

differential diagnosis includes infectious-idiopathic

pericarditis.

## 2018-08-02 NOTE — RADIOLOGY REPORT (SQ)
EXAM DESCRIPTION:

XR CHEST 1 VIEW



COMPLETED DATE/TME:  08/02/2018 04:40



CLINICAL HISTORY:

27 years Female, fever



COMPARISON:

7.7.18



NUMBER OF VIEWS/TECHNIQUE:

1/AP



FINDINGS:



Adequate lung volume, new moderate rounded patchy opacity of the

right lower lobe, recurrent/worsened mild/moderate mixed streaky

and patchy opacity of the left lower lung field, mildly enlarged

cardiac silhouette, and intact bony thorax.



IMPRESSION:



Moderate bilateral lower lobar pneumonia. Interval

worsening/recurrence. Recommend CR/CT surveillance including at

7-12 weeks following initiation of clinically warranted therapy.

## 2018-08-02 NOTE — ER DOCUMENT REPORT
ED General





- General


Chief Complaint: Fever


Stated Complaint: UNRESPONSIVE


Time Seen by Provider: 18 04:30


Notes: 


Patient is a 27-year-old female who presents with complaint of drug overdose 

and fever.  She apparently just left AMA from [facility].  She was 

admitted thereafter being treated for endocarditis.  She left and then came 

home and then did more drugs.  She is a history of IV drug abuse.  Paramedics 

said that there was some form of pipe that she was using distorts of the had a 

white powder in it.  She thought it was hurting but is really unclear exactly 

was.  She was found apneic and blue.  They gave her Narcan and she awoke very 

restless.  Temp on the ambulance is 101.1.  She was so restless that they were 

unable to get her the ambulance and therefore they did give her 5 mg of Haldol. 

Accucheck is 53.  Is somewhat restless but is able to tell me that she did do 

some form of drugs today that she thought was heroin.  She is also able to tell 

me that she did leave AMA from [facility] after being treated for 

several weeks for endocarditis and she has still been spiking fevers.





TRAVEL OUTSIDE OF THE [place] IN LAST 30 DAYS: No





- Related Data


Allergies/Adverse Reactions: 


 





Penicillins Allergy (Severe, Verified 17 19:44)


 Hives,anaphylaxis


Sulfa (Sulfonamide Antibiotics) Allergy (Severe, Verified 17 19:44)


 Hives,anaphylaxis











Past Medical History





- Social History


Smoking Status: Unknown if Ever Smoked


Frequency of alcohol use: Occasional


Drug Abuse: Heroin


Family History: Malignancy


Pulmonary Medical History: Reports: Hx Bronchitis


Renal/ Medical History: Denies: Hx Peritoneal Dialysis


Musculoskeletal Medical History: Denies Hx Fibromyalgia, Reports Hx 

Musculoskeletal Trauma


Psychiatric Medical History: 


   Denies: Hx Bipolar Disorder, Hx Depression, Hx Post Traumatic Stress Disorder

, Hx Schizophrenia


Traumatic Medical History: Reports: Hx Fractures - lt foot   sx 2009


Infectious Medical History: Denies: Hx HIV


Past Surgical History: Reports: Hx  Section, Hx Oral Surgery, Hx 

Orthopedic Surgery - Left foot surgery





- Immunizations


Immunizations up to date: Yes


Hx Diphtheria, Pertussis, Tetanus Vaccination: Yes





Review of Systems





- Review of Systems


Notes: 





My Normal Review Basic





REVIEW OF SYSTEMS:


CONSTITUTIONAL : Fevers


EENT:   Denies eye, ear, throat, or mouth pain or symptoms.  Denies nasal or 

sinus congestion.


CARDIOVASCULAR: Has endocarditis.


RESPIRATORY: Was apneic upon arrival the paramedics.  Start breathing again 

after receiving Narcan.


GASTROINTESTINAL:  Denies abdominal pain.  Vomiting after receiving Narcan.


GENITOURINARY:  Denies difficulty urinating, painful urination, burning, 

frequency, or blood in urine.


MUSCULOSKELETAL:  Denies neck or back pain or joint pain or swelling.


SKIN:   Denies rash or skin lesions.


NEUROLOGICAL: Was unconscious related to overdose.  Denies headache.  Denies 

weakness or paralysis or loss of use of either side.  Denies problems with gait 

or speech.  Denies sensory or motor loss.


ALL OTHER SYSTEMS REVIEWED AND NEGATIVE.





Physical Exam





- Vital signs


Vitals: 


 











Pulse Ox


 


 100 


 


 18 04:33














- Notes


Notes: 





General Appearance: Patient's is thrashing back and forth in the bed.  She is 

hyperventilating.  She continues to ask for water.  She is nauseous and dry 

heaving.


Vitals: reviewed, See vital signs table.


Head: no swelling or tenderness to the head


Eyes: PERRL, EOMI, Conjuctiva clear


Mouth: Very dry mucous membranes.


Throat: No tonsillar inflammation, No airway obstruction,  No lymphadenopathy


Neck: Supple, no neck tenderness


Lungs: No wheezing, No rales, No rhonci, No accessory muscle use, good air 

exchange bilaterally.


Heart: Tachycardic rate, Regular rythm, No murmur, no rub


Abdomen: Normal BS, soft, No rigidity, No abdominal tenderness, No guarding, no 

rebound, no abdominal masses, no organomegaly


Extremities: strength 5/5 in all extremities, good pulses in all extremities, 

patient has track marks over all extremities.


Skin: warm, dry, appropriate color, no rash


Neuro: speech clear, oriented x 3, agitated affect, patient moving all 4 

extremities on her own.  Cranial nerves II through XII are grossly intact.





Course





- Re-evaluation


Re-evalutation: 





18 07:21


Patient did have a brief episode of hypotension which immediately responded to 

IV fluids.  She is awake and alert answering questions appropriately.  She is 

much more calm now.  She is eating ice chips.  She has good cap refill and good 

peripheral pulses.  I have called [facility] for treatments for her.  

I am awaiting to hear back from PICU attending.  I have ordered blood products 

for the patient.


18 07:22





18 07:48


I called and spoke with Dr. Styles at [facility].  He is MICU 

attending.  Agrees to accept the patient for transfer.  I then spoke with the 

ICU nurse and gave her the nursing report.  Patient is going to room .


18 07:53











- Vital Signs


Vital signs: 


 











Temp Pulse Resp BP Pulse Ox


 


 101.3 F H     29 H  97/55 L  100 


 


 18 07:44     18 07:44  18 07:44  18 07:44














- Laboratory


Result Diagrams: 


 18 06:01





 18 04:50


Laboratory results interpreted by me: 


 











  18





  04:43 04:50 04:50


 


WBC   


 


RBC   


 


Hgb   


 


Hct   


 


MCHC   


 


RDW   


 


Plt Count   


 


Seg Neuts % (Manual)   


 


Band Neutrophils %   


 


Lymphocytes % (Manual)   


 


Monocytes % (Manual)   


 


Abs Neuts (Manual)   


 


PT   


 


APTT   


 


Carbon Dioxide   13 L 


 


Anion Gap   23 H 


 


Creatinine   0.47 L 


 


Glucose   115 H 


 


POC Glucose  119 H  


 


Lactic Acid    13.4 H


 


Calcium   8.2 L 


 


Direct Bilirubin   0.6 H 


 


AST   70 H 


 


Lactate Dehydrogenase   


 


Albumin   2.8 L 


 


Urine Protein   


 


Urine Ketones   


 


Urine Ascorbic Acid   


 


Crossmatch   














  18





  04:50 05:12 06:01


 


WBC    22.3 H


 


RBC    2.17 L


 


Hgb    6.1 L


 


Hct    20.0 L


 


MCHC    30.6 L


 


RDW    24.4 H


 


Plt Count    12 L*


 


Seg Neuts % (Manual)    93 H


 


Band Neutrophils %    1 L


 


Lymphocytes % (Manual)    4 L


 


Monocytes % (Manual)    2 L


 


Abs Neuts (Manual)    21.0 H


 


PT  24.9 H  


 


APTT  40.8 H  


 


Carbon Dioxide   


 


Anion Gap   


 


Creatinine   


 


Glucose   


 


POC Glucose   


 


Lactic Acid   


 


Calcium   


 


Direct Bilirubin   


 


AST   


 


Lactate Dehydrogenase   


 


Albumin   


 


Urine Protein   100 H 


 


Urine Ketones   TRACE H 


 


Urine Ascorbic Acid   20 H 


 


Crossmatch   














  18





  06:01 07:02


 


WBC  


 


RBC  


 


Hgb  


 


Hct  


 


MCHC  


 


RDW  


 


Plt Count  


 


Seg Neuts % (Manual)  


 


Band Neutrophils %  


 


Lymphocytes % (Manual)  


 


Monocytes % (Manual)  


 


Abs Neuts (Manual)  


 


PT  


 


APTT  


 


Carbon Dioxide  


 


Anion Gap  


 


Creatinine  


 


Glucose  


 


POC Glucose  


 


Lactic Acid  


 


Calcium  


 


Direct Bilirubin  


 


AST  


 


Lactate Dehydrogenase  843 H 


 


Albumin  


 


Urine Protein  


 


Urine Ketones  


 


Urine Ascorbic Acid  


 


Crossmatch   See Detail














- EKG Interpretation by Me


Additional EKG results interpreted by me: 





18 04:52


EKG is reviewed and interpreted by me.  EKG shows sinus rhythm with rate of 150 

bpm.  A lot of baseline artifact this patient's is hyperventilating and will 

not stay still.  OR interval and QRS duration QTc intervals are within normal 

range.





Procedures





- Central Line


  ** Left Femoral


Consent obtained: Yes


Central line pre-insertion: Chloraprep applied, Sterile drapes applied


Central line lumen type: Triple


Ultrasound guided: Yes


Line secured with sutures: Yes


Central line post-insertion: Blood return from lumens, Biopatch applied, Sutured

, Sterile dressing applied


Number of attempts: 1


Complications: No





Critical Care Note





- Critical Care Note


Total time excluding time spent on procedures (mins): 90


Comments: 





Care time for this patient is only 90 minutes due to frequent re-evaluations, 

management of blood pressure, management of severe sepsis, management of severe 

anemia and thrombocytopenia.

## 2018-08-03 NOTE — EKG REPORT
SEVERITY:- ABNORMAL ECG -

SINUS TACHYCARDIA

NONSPECIFIC ST DEPRESSION

:

Confirmed by: Mahesh Tomas 03-Aug-2018 18:15:51